# Patient Record
(demographics unavailable — no encounter records)

---

## 2024-12-09 NOTE — PROCEDURE
[de-identified] : I injected the patient's right knee today with cortisone.  I discussed at length with the patient the planned steroid and lidocaine injection. The risks, benefits, convalescence and alternatives were reviewed. The possible side effects discussed included but were not limited to: pain, swelling, heat, bleeding, and redness. Symptoms are generally mild but if they are extensive then contact the office. Giving pain relievers by mouth such as NSAIDs or Tylenol can generally treat the reactions to steroid and lidocaine. Rare cases of infection have been noted. Rash, hives and itching may occur post injection. If you have muscle pain or cramps, flushing and or swelling of the face, rapid heart beat, nausea, dizziness, fever, chills, headache, difficulty breathing, swelling in the arms or legs, or have a prickly feeling of your skin, contact a health care provider immediately. Following this discussion, the knee was prepped with Chlorhexidine and Alcohol and under sterile conditions the 80 mg Depo-Medrol and 4 cc Lidocaine injection was performed with a 22 gauge needle through a anterolateral injection site. The needle was introduced into the joint, aspiration was performed to ensure intra-articular placement and the medication was injected. Upon withdrawal of the needle the site was cleaned with alcohol and a band aid applied. The patient tolerated the injection well and there were no adverse effects. Post injection instructions included no strenuous activity for 24 hours, cryotherapy and if there are any adverse effects to contact the office.

## 2024-12-09 NOTE — PHYSICAL EXAM
[de-identified] : General: Awake, alert, no acute distress, Patient was cooperative and appropriate during the examination.  The patient is of normal weight for height and age.  Walks without an antalgic gait.   Right shoulder Exam: Physical exam of the shoulder demonstrates normal skin without signs of skin changes or abnormalities. No erythema, warmth, or joint effusion appreciated.  Sensation intact light touch C5-T1 Palpable radial pulse Radial/ulnar/median/axillary/musculocutaneous/AIN/PIN nerves grossly intact  Range of motion: Forward Flexion: 175 Abduction: 140 External Rotation: 45 Internal Rotation: L3  Palpation: Not tender to palpation over the glenohumeral joint Moderately tender palpation over the rotator cuff insertion on the greater tuberosity Moderately tender to palpation over the AC joint Moderately tender to palpation of the biceps tendon/bicipital groove  Strength testing: Supraspinatus: 5/5 Infraspinatus: 5/5 Subscapularis: 5/5  Special test: Empty can test positive Ralph impingement test positive Speeds test negative [place]'s test negative Lift-off test negative Bell-press test negative Cross-arm adduction test positive   Right knee Examination: Physical examination of the knee demonstrates normal skin without signs of skin changes or abnormalities. No erythema or warmth is appreciated. There is no joint effusion.  Sensation is intact to light touch L2-S1 Palpable DP/PT pulse EHL/FHL/TA/GSC motor function intact  Range of Motion 0 to 125 degrees with pain at terminal flexion  Strength Testing Quadriceps/Hamstring 5/5 Patient is able to perform a straight leg raise without difficulty.  Palpation Not tender to palpation about the distal femur Not tender to palpation about the proximal tibia Not tender to palpation about the patellofemoral compartment No palpable defect appreciated in the quadriceps or patellar tendons Moderately tender to palpation of medial joint line Mildly tender to palpation of lateral joint line  Special Tests Anterior Drawer negative Posterior Drawer negative Lachman Exam negative No Varus or Valgus Laxity at 0 or 30 degrees of knee flexion Festus's Test positive medially Active compression of the patella is negative for pain Translation of the patella 2 quadrants with a firm endpoint  [de-identified] : MRI of the right shoulder from  radiology on 8/16/2024 was reviewed with the patient today in the office: -Low-grade partial-thickness bursal surface tear of the supraspinatus tendon with accompanying subacromial/subdeltoid bursitis, unchanged.  Moderate AC joint arthrosis with subcortical cystic change and stress reaction of the distal clavicle, unchanged.  Correlate clinically posttraumatic osteolysis and active AC joint pain.  A subacromial spur predispose the patient to external impingement.  Interval resolution of the infraspinatus calcific tendinosis.  MRI of the right shoulder from  radiology on 12/20/2023 was reviewed the patient today in the office: -Low-grade partial-thickness bursal surface tear of the distal supraspinatus tendon at the insertion. -Infraspinatus calcific tendinitis. -Subacromial subdeltoid bursitis. -Moderate to severe AC joint arthrosis.  MRI of the right knee from  radiology on 12/20/2023 was reviewed the patient today in the office: -Partial-thickness cartilage loss and focal stress reaction in the periphery of the medial tibial plateau. -Mild partial-thickness cartilage loss along the lateral patellar facet. -Intact menisci, cruciate and collateral ligaments.  X-rays including 4 views of the right shoulder obtained in the office on 7/31/2024 and reviewed with the patient.  There is no acute fracture or dislocation.  There is no significant arthritis.  Patient does have a type III acromion.  Mild to moderate degenerative changes are noted about the AC joint

## 2024-12-09 NOTE — HISTORY OF PRESENT ILLNESS
[de-identified] : 12/09/2024 : YARA FERRARI  is a 56 year  old female who presents to the office for follow-up evaluation of her right shoulder and right knee pain.  She states that since last office visit she still has continued pain in the shoulder and her symptoms are still extremely bothersome.  She states she has exhausted all conservative treatment including physical therapy, anti-inflammatories and cortisone injections performed by another provider.  She states she did many months of this treatment and still has pain.  She would like to move forward with surgery but still is getting denied by the insurance company.  She is here for repeat evaluation to reassess and resubmit for surgical intervention.  Regarding her knee, she did very well after cortisone injection but her symptoms have returned despite no new injury.  She like to consider another injection today.  She has no other complaints today.  She denies any new injury.  9/30/2024: Yara is a pleasant 56-year-old female who returns to the office today for follow-up evaluation of her right shoulder and her right knee.  The point we discussed proceeding with surgical intervention for her right shoulder which is failed to respond to extensive conservative care in the form of anti-inflammatory medications, physical therapy, and injections.  Unfortunately, Worker's Compensation has denied her surgery a second time despite her persistent symptoms.  Her knee, she states, is improved since receiving an injection at her last appointment.  She is here today to follow-up.  The patient denies any fevers, chills, sweats, recent illnesses, numbness, tingling, weakness, or pain elsewhere at this time.  8/21/2024: Yara is a 56-year-old female returns to the office today for follow-up evaluation of her right shoulder and right knee.  The patient states her symptoms are relatively unchanged.  Her right knee is bothering her consistently and she would like to consider repeat injection for this.  She recently had repeat MRI of her shoulder and she comes in to discuss results as well as possible surgical intervention.  The patient denies any fevers, chills, sweats, recent illnesses, numbness, tingling, weakness, or pain elsewhere at this time.  7/31/2024: Yara is a 56-year-old female who presents to the office today for evaluation of a work-related injury that occurred in March 2023.  She works as a  in the delivery area at [employer] and sustained an injury to her right shoulder and right knee.  Since the time of the injury she has had persistent pain and dysfunction in both the right shoulder and the right knee.  She has undergone physical therapy for both the shoulder and the knee within the past year.  She is also received a cortisone injection to the shoulder as well as a cortisone injection to the knee which have failed to provide her with any significant relief.  She also takes over-the-counter anti-inflammatories for pain.  Due to her persistent symptoms and lack of improvement with conservative care she was referred to me for specialist opinion.  The patient denies any fevers, chills, sweats, recent illnesses, numbness, tingling, weakness, or pain elsewhere at this time.

## 2024-12-09 NOTE — DISCUSSION/SUMMARY
[de-identified] : Assessment: 56-year-old female with right shoulder pain secondary to a partial-thickness rotator cuff tear, subacromial impingement, and AC joint arthritis; right knee pain secondary to stress reaction and early arthritis  Plan: I had a long discussion with the patient today regarding the nature of their diagnosis and treatment plan.   I reviewed the patient's imaging with them today in the office which demonstrates a right shoulder partial-thickness rotator cuff tear, infraspinatus calcific tendinitis, bursitis and impingement, and moderate to severe AC joint arthrosis.  The MRI of the knee demonstrates early degenerative changes as well as a stress reaction in the tibia.  Regarding the shoulder, we discussed the risks and benefits of no treatment as well as nonoperative and operative treatments.  Nonsurgical treatments include resting, icing, heating, stretching, anti-inflammatory medicines as needed, activity modifications, home exercises, physical therapy, and possible cortisone injections.  The benefits of nonsurgical treatments are that they avoid the risks and recovery of surgery.  The disadvantages of nonsurgical treatments are that they may incompletely alleviate the patient's symptoms.  At this point the patient has exhausted conservative care, like to proceed with surgery.  Surgical treatment would include a right shoulder arthroscopy with rotator cuff debridement vs. repair, subacromial decompression, distal clavicle excision, synovectomy and debridement, and possible open subpectoral biceps tenodesis.  The risks and benefits of surgery were discussed in detail.  The benefits include improved shoulder pain and function.  The risks include but are not limited to infection, blood loss, blood clots, neurovascular injury, stiffness/loss of range of motion, fracture, failure of surgery, persistent pain, failure of hardware, painful hardware, anesthesia related complications including paralysis and death, and the need for further surgery in the future.  Postoperatively the patient will be nonweightbearing in a sling for 2 to 4 weeks depending on the procedures performed.  Physical therapy will start 2 weeks after surgery and will be for 2 to 3 days a week for the duration of the recovery.  Expect most patients to return to unrestricted activity 3 months after surgery although some may take longer to fully recover.  The patient will also not be permitted to drive while wearing a sling or taking narcotic pain medications. The patient verbalizes their understanding of all surgical risks as well as the anticipated postoperative course.  Regarding her right shoulder she has exhausted all conservative treatment including several months of physical therapy, previous cortisone injections, ice, heat, rest, anti-inflammatory medications that were all recommended and provided by another orthopedic provider.  She states that she is interested in surgical intervention.  Will resubmit for surgical intervention due to the lack of improvement with extensive conservative treatment.  She will gather all of her documentations from the other doctor so we could resubmit for surgery.  Regarding her knee a cortisone injection was administered today in the office.  The patient tolerated this well.  She will continue with symptomatic care on her own.  She will follow-up in 3 months as needed for any  The patient verbalizes their understanding and agrees with the plan. All questions were answered to their satisfaction.   I, Dr. High, personally performed the evaluation and management (E/M) services for this established patient who presents today with (a) new problem(s)/exacerbation of (an) existing condition(s).  That E/M includes conducting the clinically appropriate interval history &/or exam, assessing all new/exacerbated conditions, and establishing a new plan of care.  Today, my RUSS, was here to observe my evaluation and management service for this new problem/exacerbated condition and follow the plan of care established by me going forward.

## 2024-12-09 NOTE — REASON FOR VISIT
[Workers' Comp: Date of Injury: _______] : This visit is related to worker's compensation. Date of Injury: [unfilled] [Initial Visit] : an initial visit for [FreeTextEntry2] : right shoulder right knee DOI 10/9/23

## 2025-01-29 NOTE — PHYSICAL EXAM
[de-identified] : General: Awake, alert, no acute distress, Patient was cooperative and appropriate during the examination.   Right shoulder Exam: Physical exam of the shoulder demonstrates normal skin without signs of skin changes or abnormalities. No erythema, warmth, or joint effusion appreciated.  Sensation intact light touch C5-T1 Palpable radial pulse Radial/ulnar/median/axillary/musculocutaneous/AIN/PIN nerves grossly intact  Range of motion: Forward Flexion: 175 Abduction: 140 External Rotation: 45 Internal Rotation: L3  Palpation: Not tender to palpation over the glenohumeral joint Moderately tender palpation over the rotator cuff insertion on the greater tuberosity Exquisitely tender to palpation over the AC joint Moderately tender to palpation of the biceps tendon/bicipital groove Mildly tender palpation over the trapezial, periscapular, paracervical muscle on right side  Strength testing: Supraspinatus: 5-/5 with pain Infraspinatus: 5-/5 with pain Subscapularis: 5-/5 with pain  Special test: Empty can test positive Ralph impingement test positive Speeds test positive [place]'s test negative Lift-off test negative Bell-press test negative Cross-arm adduction test positive   Right knee Examination: Physical examination of the knee demonstrates normal skin without signs of skin changes or abnormalities. No erythema or warmth is appreciated. There is no joint effusion.  Sensation is intact to light touch L2-S1 Palpable DP/PT pulse EHL/FHL/TA/GSC motor function intact  Range of Motion 0 to 125 degrees with pain at terminal flexion  Strength Testing Quadriceps/Hamstring 5/5 Patient is able to perform a straight leg raise without difficulty.  Palpation Not tender to palpation about the distal femur Not tender to palpation about the proximal tibia Not tender to palpation about the patellofemoral compartment No palpable defect appreciated in the quadriceps or patellar tendons Moderately tender to palpation of medial joint line Mildly tender to palpation of lateral joint line  Special Tests Anterior Drawer negative Posterior Drawer negative Lachman Exam negative No Varus or Valgus Laxity at 0 or 30 degrees of knee flexion Festus's Test positive medially Active compression of the patella is negative for pain Translation of the patella 2 quadrants with a firm endpoint  [de-identified] : MRI of the right shoulder from  radiology on 8/16/2024 was reviewed with the patient today in the office: -Low-grade partial-thickness bursal surface tear of the supraspinatus tendon with accompanying subacromial/subdeltoid bursitis, unchanged.  Moderate AC joint arthrosis with subcortical cystic change and stress reaction of the distal clavicle, unchanged.  Correlate clinically posttraumatic osteolysis and active AC joint pain.  A subacromial spur predispose the patient to external impingement.  Interval resolution of the infraspinatus calcific tendinosis.  MRI of the right shoulder from  radiology on 12/20/2023 was reviewed the patient today in the office: -Low-grade partial-thickness bursal surface tear of the distal supraspinatus tendon at the insertion. -Infraspinatus calcific tendinitis. -Subacromial subdeltoid bursitis. -Moderate to severe AC joint arthrosis.  MRI of the right knee from  radiology on 12/20/2023 was reviewed the patient today in the office: -Partial-thickness cartilage loss and focal stress reaction in the periphery of the medial tibial plateau. -Mild partial-thickness cartilage loss along the lateral patellar facet. -Intact menisci, cruciate and collateral ligaments.  X-rays including 4 views of the right shoulder obtained in the office on 7/31/2024 and reviewed with the patient.  There is no acute fracture or dislocation.  There is no significant arthritis.  Patient does have a type III acromion.  Mild to moderate degenerative changes are noted about the AC joint

## 2025-01-29 NOTE — DISCUSSION/SUMMARY
[de-identified] : Assessment: 56-year-old female with right shoulder pain secondary to a partial-thickness rotator cuff tear, subacromial impingement, and AC joint arthritis; right knee pain secondary to stress reaction and early arthritis  Plan: I had a long discussion with the patient today regarding the nature of their diagnosis and treatment plan.   I reviewed the patient's imaging with them today in the office which demonstrates a right shoulder partial-thickness rotator cuff tear, infraspinatus calcific tendinitis, bursitis and impingement, and moderate to severe AC joint arthrosis.  The MRI of the knee demonstrates early degenerative changes as well as a stress reaction in the tibia.  Regarding the shoulder, we discussed the risks and benefits of no treatment as well as nonoperative and operative treatments.  Nonsurgical treatments include resting, icing, heating, stretching, anti-inflammatory medicines as needed, activity modifications, home exercises, physical therapy, and possible cortisone injections.  The benefits of nonsurgical treatments are that they avoid the risks and recovery of surgery.  The disadvantages of nonsurgical treatments are that they may incompletely alleviate the patient's symptoms.  At this point the patient has exhausted conservative care, like to proceed with surgery.  Surgical treatment would include a right shoulder arthroscopy with rotator cuff debridement vs. repair, subacromial decompression, distal clavicle excision, synovectomy and debridement, and possible open subpectoral biceps tenodesis.  The risks and benefits of surgery were discussed in detail.  The benefits include improved shoulder pain and function.  The risks include but are not limited to infection, blood loss, blood clots, neurovascular injury, stiffness/loss of range of motion, fracture, failure of surgery, persistent pain, failure of hardware, painful hardware, anesthesia related complications including paralysis and death, and the need for further surgery in the future.  Postoperatively the patient will be nonweightbearing in a sling for 2 to 4 weeks depending on the procedures performed.  Physical therapy will start 2 weeks after surgery and will be for 2 to 3 days a week for the duration of the recovery.  Expect most patients to return to unrestricted activity 3 months after surgery although some may take longer to fully recover.  The patient will also not be permitted to drive while wearing a sling or taking narcotic pain medications. The patient verbalizes their understanding of all surgical risks as well as the anticipated postoperative course.  Regarding her right shoulder she has exhausted all conservative treatment including several months of physical therapy, previous cortisone injections, ice, heat, rest, anti-inflammatory medications that were all recommended and provided by another orthopedic provider.  She states that she is interested in surgical intervention given failure of conservative measures.  Will resubmit for surgical intervention due to the lack of improvement with extensive conservative treatment with a level 3 request, letter of medical necessity.  Also for diagnostic and therapeutic purposes we will attempt 1 final AC joint cortisone injection in the office today.  She tolerated the procedure well with no adverse effects.  If she fails conservative treatment pending approval we will consider surgery in 6 weeks.  She will follow-up with surgery once approved.   The patient verbalizes their understanding and agrees with the plan. All questions were answered to their satisfaction.   I, Dr. High, personally performed the evaluation and management (E/M) services for this established patient who presents today with (a) new problem(s)/exacerbation of (an) existing condition(s).  That E/M includes conducting the clinically appropriate interval history &/or exam, assessing all new/exacerbated conditions, and establishing a new plan of care.  Today, my RUSS, was here to observe my evaluation and management service for this new problem/exacerbated condition and follow the plan of care established by me going forward.

## 2025-01-29 NOTE — PROCEDURE
[de-identified] : I injected the patient's right shoulder AC Joint today with cortisone.   I discussed at length with the patient the planned steroid and lidocaine injection. The risks, benefits, convalescence and alternatives were reviewed. The possible side effects discussed included but were not limited to: pain, swelling, heat, bleeding, and redness. Symptoms are generally mild but if they are extensive then contact the office. Giving pain relievers by mouth such as NSAIDs or Tylenol can generally treat the reactions to steroid and lidocaine. Rare cases of infection have been noted. Rash, hives and itching may occur post injection. If you have muscle pain or cramps, flushing and or swelling of the face, rapid heart beat, nausea, dizziness, fever, chills, headache, difficulty breathing, swelling in the arms or legs, or have a prickly feeling of your skin, contact a health care provider immediately. Following this discussion, the shoulder was prepped with Chlorhexidine and Alcohol and under sterile conditions the 40 mg Depo-Medrol and 1 cc Lidocaine injection was performed with a 22 gauge needle through a direct superior injection site. The needle was introduced into the AC Joint space and the medication was injected. Upon withdrawal of the needle the site was cleaned with alcohol and a band aid was applied. The patient tolerated the injection well and there were no adverse effects. Post injection instructions included no strenuous activity for 24 hours, cryotherapy and if there are any adverse effects to contact the office.

## 2025-01-29 NOTE — HISTORY OF PRESENT ILLNESS
[de-identified] : 01/29/2025 : YARA FERRARI  is a 56 year  old female who presents to the office for follow-up evaluation of the right knee and shoulder.  She states the surgery was denied for the right shoulder because of lack of physical therapy over 3-month period and lack of cortisone injections.  She states she is in worsening pain and now is having pain around her trap, scapular muscles posteriorly because she feels she is compensating.  She states the pain is worse certain activities and motions and she cannot sleep because of the pain.  She states she can perform activities of daily living.  She has had cortisone injections, therapy and done medications all which have given little to no relief and only mild short-term relief done by another provider.  She is here to discuss options.  She states PT is also being denied.  She has no other complaints today.  She denies any new injury.  12/09/2024 : YARA FERRARI  is a 56 year  old female who presents to the office for follow-up evaluation of her right shoulder and right knee pain.  She states that since last office visit she still has continued pain in the shoulder and her symptoms are still extremely bothersome.  She states she has exhausted all conservative treatment including physical therapy, anti-inflammatories and cortisone injections performed by another provider.  She states she did many months of this treatment and still has pain.  She would like to move forward with surgery but still is getting denied by the insurance company.  She is here for repeat evaluation to reassess and resubmit for surgical intervention.  Regarding her knee, she did very well after cortisone injection but her symptoms have returned despite no new injury.  She like to consider another injection today.  She has no other complaints today.  She denies any new injury.  9/30/2024: Yara is a pleasant 56-year-old female who returns to the office today for follow-up evaluation of her right shoulder and her right knee.  The point we discussed proceeding with surgical intervention for her right shoulder which is failed to respond to extensive conservative care in the form of anti-inflammatory medications, physical therapy, and injections.  Unfortunately, Worker's Compensation has denied her surgery a second time despite her persistent symptoms.  Her knee, she states, is improved since receiving an injection at her last appointment.  She is here today to follow-up.  The patient denies any fevers, chills, sweats, recent illnesses, numbness, tingling, weakness, or pain elsewhere at this time.  8/21/2024: Yara is a 56-year-old female returns to the office today for follow-up evaluation of her right shoulder and right knee.  The patient states her symptoms are relatively unchanged.  Her right knee is bothering her consistently and she would like to consider repeat injection for this.  She recently had repeat MRI of her shoulder and she comes in to discuss results as well as possible surgical intervention.  The patient denies any fevers, chills, sweats, recent illnesses, numbness, tingling, weakness, or pain elsewhere at this time.  7/31/2024: Yara is a 56-year-old female who presents to the office today for evaluation of a work-related injury that occurred in March 2023.  She works as a  in the delivery area at [employer] and sustained an injury to her right shoulder and right knee.  Since the time of the injury she has had persistent pain and dysfunction in both the right shoulder and the right knee.  She has undergone physical therapy for both the shoulder and the knee within the past year.  She is also received a cortisone injection to the shoulder as well as a cortisone injection to the knee which have failed to provide her with any significant relief.  She also takes over-the-counter anti-inflammatories for pain.  Due to her persistent symptoms and lack of improvement with conservative care she was referred to me for specialist opinion.  The patient denies any fevers, chills, sweats, recent illnesses, numbness, tingling, weakness, or pain elsewhere at this time.

## 2025-03-31 NOTE — DISCUSSION/SUMMARY
[de-identified] : Assessment: 56-year-old female with right shoulder pain secondary to a partial-thickness rotator cuff tear, subacromial impingement, and AC joint arthritis; right knee pain secondary to stress reaction and early arthritis  Plan: I had a long discussion with the patient today regarding the nature of their diagnosis and treatment plan.   I reviewed the patient's imaging with them today in the office which demonstrates a right shoulder partial-thickness rotator cuff tear, infraspinatus calcific tendinitis, bursitis and impingement, and moderate to severe AC joint arthrosis.  The MRI of the knee demonstrates early degenerative changes as well as a stress reaction in the tibia.  Regarding the shoulder, we discussed the risks and benefits of no treatment as well as nonoperative and operative treatments.  Nonsurgical treatments include resting, icing, heating, stretching, anti-inflammatory medicines as needed, activity modifications, home exercises, physical therapy, and possible cortisone injections.  The benefits of nonsurgical treatments are that they avoid the risks and recovery of surgery.  The disadvantages of nonsurgical treatments are that they may incompletely alleviate the patient's symptoms.  At this point the patient has exhausted conservative care, like to proceed with surgery.  Surgical treatment would include a right shoulder arthroscopy with rotator cuff debridement vs. repair, subacromial decompression, distal clavicle excision, synovectomy and debridement, and possible open subpectoral biceps tenodesis.  The risks and benefits of surgery were discussed in detail.  The benefits include improved shoulder pain and function.  The risks include but are not limited to infection, blood loss, blood clots, neurovascular injury, stiffness/loss of range of motion, fracture, failure of surgery, persistent pain, failure of hardware, painful hardware, anesthesia related complications including paralysis and death, and the need for further surgery in the future.  Postoperatively the patient will be nonweightbearing in a sling for 2 to 4 weeks depending on the procedures performed.  Physical therapy will start 2 weeks after surgery and will be for 2 to 3 days a week for the duration of the recovery.  Expect most patients to return to unrestricted activity 3 months after surgery although some may take longer to fully recover.  The patient will also not be permitted to drive while wearing a sling or taking narcotic pain medications. The patient verbalizes their understanding of all surgical risks as well as the anticipated postoperative course.  At this time for the right shoulder the MRI is old and outdated and as per Workmen's Comp. we will request authorization for a new MRI for presurgical planning due to the lack of improvement with conservative treatment including physical therapy, anti-inflammatories, at home exercises and several previous injections into the right shoulder without resolution of her symptoms.  She will follow-up after the MRI is complete for further discussion and to plan for surgery.  In regards to the knee at this time I am recommending that we request for authorization for gel injections given the chronicity of her symptoms and lack of improvement with conservative treatment including cortisone injections and therapy.  She will follow-up once authorization is received for the series of gel injections.  She understands and agrees and all questions were answered.   The patient verbalizes their understanding and agrees with the plan. All questions were answered to their satisfaction.   I, Dr. High, personally performed the evaluation and management (E/M) services for this established patient who presents today with (a) new problem(s)/exacerbation of (an) existing condition(s).  That E/M includes conducting the clinically appropriate interval history &/or exam, assessing all new/exacerbated conditions, and establishing a new plan of care.  Today, my RUSS, was here to observe my evaluation and management service for this new problem/exacerbated condition and follow the plan of care established by me going forward.

## 2025-03-31 NOTE — HISTORY OF PRESENT ILLNESS
[de-identified] : 03/31/2025 : YARA FERRARI  is a 56 year  old female who presents to the office for follow-up evaluation of her right knee and shoulder.  She states the cortisone injection given back in December gave her good relief of the knee but recently the knee pain started bothering her again.  She has had 2 injections into the knee in the past that gave good relief but not long-term.  She is here for repeat evaluation for the knee and shoulder.  She states that shoulder surgery was denied because the MRI was too old as per Workmen's Compensation and he she is here to discuss possibly getting a new MRI for presurgical planning purposes because she still has shoulder pain.  She has done physical therapy exercises at home consistently since her last office visit and had a cortisone injection back in January that gave some relief but not long-term relief.  She has done physical therapy formally for a very long time within the past year that did not give sufficient relief.  She denies any numbness or tingling distally.  She denies any new injury.  01/29/2025 : YARA FERRARI  is a 56 year  old female who presents to the office for follow-up evaluation of the right knee and shoulder.  She states the surgery was denied for the right shoulder because of lack of physical therapy over 3-month period and lack of cortisone injections.  She states she is in worsening pain and now is having pain around her trap, scapular muscles posteriorly because she feels she is compensating.  She states the pain is worse certain activities and motions and she cannot sleep because of the pain.  She states she can perform activities of daily living.  She has had cortisone injections, therapy and done medications all which have given little to no relief and only mild short-term relief done by another provider.  She is here to discuss options.  She states PT is also being denied.  She has no other complaints today.  She denies any new injury.  12/09/2024 : YARA FERRARI  is a 56 year  old female who presents to the office for follow-up evaluation of her right shoulder and right knee pain.  She states that since last office visit she still has continued pain in the shoulder and her symptoms are still extremely bothersome.  She states she has exhausted all conservative treatment including physical therapy, anti-inflammatories and cortisone injections performed by another provider.  She states she did many months of this treatment and still has pain.  She would like to move forward with surgery but still is getting denied by the insurance company.  She is here for repeat evaluation to reassess and resubmit for surgical intervention.  Regarding her knee, she did very well after cortisone injection but her symptoms have returned despite no new injury.  She like to consider another injection today.  She has no other complaints today.  She denies any new injury.  9/30/2024: Yara is a pleasant 56-year-old female who returns to the office today for follow-up evaluation of her right shoulder and her right knee.  The point we discussed proceeding with surgical intervention for her right shoulder which is failed to respond to extensive conservative care in the form of anti-inflammatory medications, physical therapy, and injections.  Unfortunately, Worker's Compensation has denied her surgery a second time despite her persistent symptoms.  Her knee, she states, is improved since receiving an injection at her last appointment.  She is here today to follow-up.  The patient denies any fevers, chills, sweats, recent illnesses, numbness, tingling, weakness, or pain elsewhere at this time.  8/21/2024: Yara is a 56-year-old female returns to the office today for follow-up evaluation of her right shoulder and right knee.  The patient states her symptoms are relatively unchanged.  Her right knee is bothering her consistently and she would like to consider repeat injection for this.  She recently had repeat MRI of her shoulder and she comes in to discuss results as well as possible surgical intervention.  The patient denies any fevers, chills, sweats, recent illnesses, numbness, tingling, weakness, or pain elsewhere at this time.  7/31/2024: Yara is a 56-year-old female who presents to the office today for evaluation of a work-related injury that occurred in March 2023.  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  Since the time of the injury she has had persistent pain and dysfunction in both the right shoulder and the right knee.  She has undergone physical therapy for both the shoulder and the knee within the past year.  She is also received a cortisone injection to the shoulder as well as a cortisone injection to the knee which have failed to provide her with any significant relief.  She also takes over-the-counter anti-inflammatories for pain.  Due to her persistent symptoms and lack of improvement with conservative care she was referred to me for specialist opinion.  The patient denies any fevers, chills, sweats, recent illnesses, numbness, tingling, weakness, or pain elsewhere at this time.

## 2025-03-31 NOTE — PHYSICAL EXAM
[de-identified] : General: Awake, alert, no acute distress, Patient was cooperative and appropriate during the examination.   Right shoulder Exam: Physical exam of the shoulder demonstrates normal skin without signs of skin changes or abnormalities. No erythema, warmth, or joint effusion appreciated.  Sensation intact light touch C5-T1 Palpable radial pulse Radial/ulnar/median/axillary/musculocutaneous/AIN/PIN nerves grossly intact  Range of motion: Forward Flexion: 175 Abduction: 140 External Rotation: 45 Internal Rotation: L3  Palpation: Not tender to palpation over the glenohumeral joint Moderately tender palpation over the rotator cuff insertion on the greater tuberosity Exquisitely tender to palpation over the AC joint Moderately tender to palpation of the biceps tendon/bicipital groove Mildly tender palpation over the trapezial, periscapular, paracervical muscle on right side  Strength testing: Supraspinatus: 5-/5 with pain Infraspinatus: 5-/5 with pain Subscapularis: 5-/5 with pain  Special test: Empty can test positive Ralph impingement test positive Speeds test positive Pima's test negative Lift-off test negative Bell-press test negative Cross-arm adduction test positive   Right knee Examination: Physical examination of the knee demonstrates normal skin without signs of skin changes or abnormalities. No erythema or warmth is appreciated. There is no joint effusion.  Sensation is intact to light touch L2-S1 Palpable DP/PT pulse EHL/FHL/TA/GSC motor function intact  Range of Motion 0 to 125 degrees with pain at terminal flexion  Strength Testing Quadriceps/Hamstring 5/5 Patient is able to perform a straight leg raise without difficulty.  Palpation Not tender to palpation about the distal femur Not tender to palpation about the proximal tibia Not tender to palpation about the patellofemoral compartment No palpable defect appreciated in the quadriceps or patellar tendons Moderately tender to palpation of medial joint line Mildly tender to palpation of lateral joint line  Special Tests Anterior Drawer negative Posterior Drawer negative Lachman Exam negative No Varus or Valgus Laxity at 0 or 30 degrees of knee flexion Festus's Test positive medially Active compression of the patella is negative for pain Translation of the patella 2 quadrants with a firm endpoint  [de-identified] : MRI of the right shoulder from  radiology on 8/16/2024 was reviewed with the patient today in the office: -Low-grade partial-thickness bursal surface tear of the supraspinatus tendon with accompanying subacromial/subdeltoid bursitis, unchanged.  Moderate AC joint arthrosis with subcortical cystic change and stress reaction of the distal clavicle, unchanged.  Correlate clinically posttraumatic osteolysis and active AC joint pain.  A subacromial spur predispose the patient to external impingement.  Interval resolution of the infraspinatus calcific tendinosis.  MRI of the right shoulder from  radiology on 12/20/2023 was reviewed the patient today in the office: -Low-grade partial-thickness bursal surface tear of the distal supraspinatus tendon at the insertion. -Infraspinatus calcific tendinitis. -Subacromial subdeltoid bursitis. -Moderate to severe AC joint arthrosis.  MRI of the right knee from  radiology on 12/20/2023 was reviewed the patient today in the office: -Partial-thickness cartilage loss and focal stress reaction in the periphery of the medial tibial plateau. -Mild partial-thickness cartilage loss along the lateral patellar facet. -Intact menisci, cruciate and collateral ligaments.  X-rays including 4 views of the right shoulder obtained in the office on 7/31/2024 and reviewed with the patient.  There is no acute fracture or dislocation.  There is no significant arthritis.  Patient does have a type III acromion.  Mild to moderate degenerative changes are noted about the AC joint